# Patient Record
Sex: MALE | Race: WHITE | URBAN - NONMETROPOLITAN AREA
[De-identification: names, ages, dates, MRNs, and addresses within clinical notes are randomized per-mention and may not be internally consistent; named-entity substitution may affect disease eponyms.]

---

## 2017-07-10 ENCOUNTER — HISTORY (OUTPATIENT)
Dept: EMERGENCY MEDICINE | Facility: OTHER | Age: 32
End: 2017-07-10

## 2017-07-19 ENCOUNTER — OFFICE VISIT - GICH (OUTPATIENT)
Dept: INTERNAL MEDICINE | Facility: OTHER | Age: 32
End: 2017-07-19

## 2017-07-19 ENCOUNTER — HISTORY (OUTPATIENT)
Dept: INTERNAL MEDICINE | Facility: OTHER | Age: 32
End: 2017-07-19

## 2017-07-19 DIAGNOSIS — F32.1 MAJOR DEPRESSIVE DISORDER, SINGLE EPISODE, MODERATE (H): ICD-10-CM

## 2017-07-19 ASSESSMENT — ANXIETY QUESTIONNAIRES
5. BEING SO RESTLESS THAT IT IS HARD TO SIT STILL: NEARLY EVERY DAY
7. FEELING AFRAID AS IF SOMETHING AWFUL MIGHT HAPPEN: NOT AT ALL
1. FEELING NERVOUS, ANXIOUS, OR ON EDGE: NEARLY EVERY DAY
GAD7 TOTAL SCORE: 18
2. NOT BEING ABLE TO STOP OR CONTROL WORRYING: NEARLY EVERY DAY
4. TROUBLE RELAXING: NEARLY EVERY DAY
3. WORRYING TOO MUCH ABOUT DIFFERENT THINGS: NEARLY EVERY DAY
6. BECOMING EASILY ANNOYED OR IRRITABLE: NEARLY EVERY DAY

## 2017-07-19 ASSESSMENT — PATIENT HEALTH QUESTIONNAIRE - PHQ9: SUM OF ALL RESPONSES TO PHQ QUESTIONS 1-9: 9

## 2017-12-27 NOTE — PROGRESS NOTES
Patient Information     Patient Name MRN Sex Madi Graf [id number] Male 1985      Progress Notes by Chidi Jordan MD at 2017  3:20 PM     Author:  Chidi Jordan MD Service:  (none) Author Type:  Physician     Filed:  2017  4:00 PM Encounter Date:  2017 Status:  Signed     :  Chidi Jordan MD (Physician)            SUBJECTIVE:    Madi Trujillo is a 32 y.o. male who presents for initiation of care.    HPI Comments: This patient is in today for treatment for depression. He says that he just feels overall depressed and needs to be on something for this. He has not been treated in the past. He also wonders if he might be bipolar as he does describe some events where it sounds like he was probably at least hypomanic. He had very poor sleep patterns and lots of energy, the problem was that this was at a time in his life when he was also working nights and was excessively stressed, etc. He is now back to a regular job and this this is manifest more with a depressive picture than anything. See his pH Q9 score from today. He has a lot of stress in his life, he has a 5-1/2-year-old son who he likes to take care of. The mother is still in the patient's life but doesn't help out as much with recurring the son is much as the patient would like. He is currently gainfully employed in construction. He does smoke cigarettes but he is not abusing alcohol and is not abusing any street drugs, something that he has done in the past admittedly. He is here to talk about this and hopefully get started on some treatment.      No Known Allergies,   No current outpatient prescriptions on file.     No current facility-administered medications for this visit.      Medications have been reviewed by me and are current to the best of my knowledge and ability. , No past medical history on file.,   Patient Active Problem List      Diagnosis Date Noted     Moderate single current episode of major depressive  disorder (HC) 2017   ,   Past Surgical History:      Procedure  Laterality Date     FOOT SURGERY      Chainsaw accident       LUMBAR DISC SURGERY      and   Social History        Substance Use Topics          Smoking status:   Current Every Day Smoker      Packs/day:  0.50      Years:  20.00      Types:  Cigarettes      Smokeless tobacco:   Never Used      Alcohol use   Yes      Comment: 10 drinks per week beer       Family Status     Relation  Status     Mother      Father Alive     Sister Alive     Brother Alive     Sister Alive     Social History     Social History        Marital status:  Single     Spouse name: N/A     Number of children:  N/A     Years of education:  N/A     Social History Main Topics          Smoking status:   Current Every Day Smoker      Packs/day:  0.50      Years:  20.00      Types:  Cigarettes      Smokeless tobacco:   Never Used      Alcohol use   Yes      Comment: 10 drinks per week beer       Drug use:   No      Sexual activity:   Not Asked      Other Topics  Concern     None      Social History Narrative     Single, works for [employer]--lives in [place]       REVIEW OF SYSTEMS:  Review of Systems   Constitutional: Negative for chills, diaphoresis, fever, malaise/fatigue and weight loss.   HENT: Negative for congestion, ear pain, nosebleeds, sore throat and tinnitus.    Eyes: Negative for blurred vision, double vision, photophobia, pain, discharge and redness.   Respiratory: Negative for cough, hemoptysis, sputum production, shortness of breath and wheezing.    Cardiovascular: Negative for chest pain, palpitations, orthopnea, claudication, leg swelling and PND.   Gastrointestinal: Negative for abdominal pain, blood in stool, constipation, diarrhea, heartburn, nausea and vomiting.   Genitourinary: Negative for dysuria, flank pain and hematuria.   Musculoskeletal: Negative for back pain, joint pain, myalgias and neck pain.   Skin: Negative for itching and rash.  "  Neurological: Negative for dizziness, tingling, tremors, speech change, loss of consciousness, weakness and headaches.   Psychiatric/Behavioral: Negative for depression, hallucinations, memory loss, substance abuse and suicidal ideas. The patient is not nervous/anxious.        OBJECTIVE:  BP 98/64  Pulse 80  Ht 1.803 m (5' 11\")  Wt 76 kg (167 lb 9.6 oz)  BMI 23.38 kg/m2    EXAM:   Physical Exam   Constitutional: He is well-developed, well-nourished, and in no distress. No distress.   HENT:   Head: Normocephalic.   Neck: Normal range of motion. Neck supple. No JVD present. No tracheal deviation present. No thyromegaly present.   Cardiovascular: Normal rate, regular rhythm and normal heart sounds.  Exam reveals no gallop.    No murmur heard.  Pulmonary/Chest: Effort normal and breath sounds normal. No respiratory distress. He has no wheezes. He has no rales.   Abdominal: Soft. Bowel sounds are normal. He exhibits no distension and no mass. There is no tenderness. There is no rebound and no guarding.   Musculoskeletal: He exhibits no edema.   Lymphadenopathy:     He has no cervical adenopathy.   Neurological: He is alert.   Skin: Skin is warm and dry. He is not diaphoretic.   Nursing note and vitals reviewed.      ASSESSMENT/PLAN:    ICD-10-CM    1. Moderate single current episode of major depressive disorder (HC) F32.1 sertraline (ZOLOFT) 100 mg tablet        Plan:  This patient is clearly depressed. There is a suggestion of possible bipolar disorder but this is not yet clear. I am going to first treat him for depression, he is started on sertraline 100 mg one half tablet daily. This can be increased to a whole tablet after 10 days or so if he does not have significant improvement. He was warned of possible side effects. I will have him back to see me again in about 3 weeks for reassessment area and I think that we will explore the possibility of bipolar and manic or hypomanic episodes in more detail at that " time and consider starting him on a mood stabilizer with consideration of possibly weaning off the antidepressant if it is deemed to be more of a bipolar disorder. This was all discussed with the patient and he is in agreement. He will let me know if he has any side effects or problems with the medication in the interim.

## 2017-12-30 NOTE — NURSING NOTE
Patient Information     Patient Name MRN Madi Mcclain [id number] Male 1985      Nursing Note by Jimena Frazier LPN at 2017  3:20 PM     Author:  Jimena Frazier LPN Service:  (none) Author Type:  NURS- Licensed Practical Nurse     Filed:  2017  3:35 PM Encounter Date:  2017 Status:  Signed     :  Jimena Frazier LPN (NURS- Licensed Practical Nurse)            The patient is here today to establish care with a new provider. The patient states he is having a difficult time with things that have happened recently in his life and would like to discuss getting some medications.  Jimena Frazier LPN......2017  3:26 PM

## 2018-01-27 VITALS
HEIGHT: 71 IN | SYSTOLIC BLOOD PRESSURE: 98 MMHG | WEIGHT: 167.6 LBS | BODY MASS INDEX: 23.46 KG/M2 | HEART RATE: 80 BPM | DIASTOLIC BLOOD PRESSURE: 64 MMHG

## 2018-02-01 ASSESSMENT — PATIENT HEALTH QUESTIONNAIRE - PHQ9: SUM OF ALL RESPONSES TO PHQ QUESTIONS 1-9: 9

## 2018-02-01 ASSESSMENT — ANXIETY QUESTIONNAIRES: GAD7 TOTAL SCORE: 18
